# Patient Record
Sex: MALE | Race: WHITE
[De-identification: names, ages, dates, MRNs, and addresses within clinical notes are randomized per-mention and may not be internally consistent; named-entity substitution may affect disease eponyms.]

---

## 2019-09-23 ENCOUNTER — HOSPITAL ENCOUNTER (EMERGENCY)
Dept: HOSPITAL 41 - JD.ED | Age: 29
Discharge: HOME | End: 2019-09-23
Payer: COMMERCIAL

## 2019-09-23 DIAGNOSIS — M54.5: Primary | ICD-10-CM

## 2019-09-23 DIAGNOSIS — F17.210: ICD-10-CM

## 2019-09-23 PROCEDURE — 99283 EMERGENCY DEPT VISIT LOW MDM: CPT

## 2019-09-23 NOTE — EDM.PDOC
ED HPI GENERAL MEDICAL PROBLEM





- General


Chief Complaint: Back Pain or Injury


Stated Complaint: LOWER BACK PAIN


Time Seen by Provider: 09/23/19 16:27


Source of Information: Reports: Patient


History Limitations: Reports: No Limitations





- History of Present Illness


INITIAL COMMENTS - FREE TEXT/NARRATIVE: 


Patient is a 29-year-old male who presents to the ED complaining of low back 

pain with pain radiating down the posterior aspects of the legs just above the 

ankles bilaterally. Patient states today he was lifting a heavy piece of 

equipment called a bearing housing for pumpline pump. States after lifting 

developed this pain that has increased. Notes increased pain with bending over 

and ambulating.  Denies foot drop, saddle anesthesia, incontinence to urine or 

stool, abdominal pain, or urinary retention. He has no history of back issues 

in the past as such. Denies any back surgeries. He has taken ibuprofen 600 mg 

prior to arrival with no relief.





Treatments PTA: Reports: NSAIDS


  ** Lower Back


Pain Score (Numeric/FACES): 7





- Related Data


 Allergies











Allergy/AdvReac Type Severity Reaction Status Date / Time


 


No Known Allergies Allergy   Verified 09/23/19 16:12











Home Meds: 


 Home Meds





Acetaminophen/HYDROcodone [Norco 325-5 MG] 1 tab PO Q6H PRN #10 tablet 09/23/19 

[Rx]


Cyclobenzaprine [Flexeril] 10 mg PO TID PRN #21 tab 09/23/19 [Rx]


Ibuprofen 600 mg PO DAILY 09/23/19 [History]


Ibuprofen 800 mg PO TID PRN #30 tablet 09/23/19 [Rx]











Past Medical History


Genitourinary History: Reports: Renal Calculus


Neurological History: Reports: Migraines





- Past Surgical History


Musculoskeletal Surgical History: Reports: Shoulder Surgery





Social & Family History





- Tobacco Use


Smoking Status *Q: Current Every Day Smoker


Years of Tobacco use: 15


Packs/Tins Daily: 0.5





- Caffeine Use


Caffeine Use: Reports: Energy Drinks, Soda, Tea





- Recreational Drug Use


Recreational Drug Use: No





ED ROS GENERAL





- Review of Systems


Review Of Systems: ROS reveals no pertinent complaints other than HPI.





ED EXAM,LOWER BACK PAIN/INJURY





- Physical Exam


Exam: See Below


Exam Limited By: No Limitations


General Appearance: Alert, WD/WN, Moderate Distress


Eye Exam: Bilateral Eye: Normal Inspection


Ears: Hearing Grossly Normal


Nose: Normal Inspection


Throat/Mouth: Normal Voice, No Airway Compromise


Head: Atraumatic, Normocephalic


Neck: Normal Inspection, Supple


Respiratory/Chest: No Respiratory Distress, Lungs Clear, Normal Breath Sounds, 

No Accessory Muscle Use


Cardiovascular: Normal Peripheral Pulses, Regular Rate, Rhythm, No Murmur


GI/Abdominal: Normal Bowel Sounds, Soft, Non-Tender, No Organomegaly, No 

Distention


Back Exam: Normal Inspection, Decreased Range of Motion, Paraspinal Tenderness (

Beltline), Vertebral Tenderness (Just above beltline L4-L5)


Extremities: Normal Inspection, Non-Tender, No Pedal Edema


Neurological: Alert, Normal Mood/Affect, Normal Dorsiflexion, CN II-XII Intact, 

Normal Plantar Flexion, No Motor/Sensory Deficits, Oriented x 3, Difficulty 

Walking (Due to back pain.), Other (No weakness discrepancy is noted to the 

lower extremities.).  No: Normal Gait, Straight Leg Raise (L), Straight Leg 

Raise (R)


Psychiatric: Normal Affect, Normal Mood


Skin Exam: Warm, Dry





Course





- Vital Signs


Last Recorded V/S: 


 Last Vital Signs











Temp  98.1 F   09/23/19 16:07


 


Pulse  98   09/23/19 16:07


 


Resp  16   09/23/19 16:07


 


BP  112/83   09/23/19 16:07


 


Pulse Ox  99   09/23/19 16:07














- Orders/Labs/Meds


Meds: 


Medications














Discontinued Medications














Generic Name Dose Route Start Last Admin





  Trade Name Fabian  PRN Reason Stop Dose Admin


 


Hydrocodone Bitart/Acetaminophen  1 tab  09/23/19 16:39  09/23/19 16:51





  [place] 325-5 Mg  PO  09/23/19 16:40  1 tab





  ONETIME ONE   Administration





     





     





     





     


 


Cyclobenzaprine HCl  10 mg  09/23/19 16:39  09/23/19 16:51





  Flexeril  PO  09/23/19 16:40  10 mg





  ONETIME ONE   Administration





     





     





     





     














- Re-Assessments/Exams


Free Text/Narrative Re-Assessment/Exam: 


Patient has back discomfort along the L4-L5 region with radiation of pain down 

his legs just above the ankles. He states most of the pain is actually along 

the lateral aspect of his legs along the IT band and stops just above the 

knees. I'm concerned patient has strained his back and also may have caused 

disc herniation with impingement of  lumbar nerve. This will need to be 

confirmed by MRI if symptoms do not improve or worsen. Treatment today will 

consist of pain management including: Norco one tablet every 6 hours and also 

Flexeril one tab 3 times a day when necessary for  muscle spasms and pain.  He'

ll be off work over the next couple days. He will follow up with a occupational 

med doctor for further evaluation and modification of job duties this week. 

Return precautions were discussed with the patient. He had no further questions 

or concerns and agreed with plan.








Departure





- Departure


Time of Disposition: 16:58


Disposition: Home, Self-Care 01


Condition: Good


Clinical Impression: 


 Low back pain radiating down leg








- Discharge Information


Prescriptions: 


Acetaminophen/HYDROcodone [Norco 325-5 MG] 1 tab PO Q6H PRN #10 tablet


 PRN Reason: Pain (Severe 7-10)


Cyclobenzaprine [Flexeril] 10 mg PO TID PRN #21 tab


 PRN Reason: Muscle Spasm


Ibuprofen 800 mg PO TID PRN #30 tablet


 PRN Reason: Pain (Mild 1-3)


Instructions:  Radicular Pain, Acute Back Pain, Adult, Pain Medicine 

Instructions, Easy-to-Read


Referrals: 


PCP,None [Primary Care Provider] - 


Forms:  ED Department Discharge, ED Return to Work/School Form


Additional Instructions: 


Refrain from any activities that cause worsening discomfort. Utilize heat with 

gentle massage and ice thereafter 3 times a day, 30 minutes in duration, do not 

apply ice directly on the skin. For mild-to-moderate pain may utilize topical 

pain cream such as Biofreeze and also ibuprofen 800 mg 3 times a day. Take the 

ibuprofen with food. Do not take any additional NSAIDs. If stomach becomes 

irritated stopped taking ibuprofen and start taking Prilosec 20 mg every day 

for the next 2 weeks. For muscle spasms take Flexeril one tablet 3 times a day. 

For severe pain take Norco one tablet every 6 hours. Do not take Norco and 

Tylenol together. Do not drive while taking the Flexeril and/or Norco. Do not 

operate any heavy equipment as well. Follow-up with occupational med doc in the 

next 3-5 days for reevaluation and modification of job duties if required. 

Return back to the ED if you develop any new or worsening symptoms.

## 2021-02-12 ENCOUNTER — HOSPITAL ENCOUNTER (EMERGENCY)
Dept: HOSPITAL 41 - JD.ED | Age: 31
Discharge: HOME | End: 2021-02-12
Payer: COMMERCIAL
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
## 2021-02-12 DIAGNOSIS — S05.02XA: Primary | ICD-10-CM

## 2021-02-12 NOTE — EDM.PDOC
ED HPI GENERAL MEDICAL PROBLEM





- General


Chief Complaint: Eye Problems


Stated Complaint: FACIAL INJURY/L EYE


Time Seen by Provider: 02/12/21 14:01


Source of Information: Reports: Patient


History Limitations: Reports: No Limitations





- History of Present Illness


INITIAL COMMENTS - FREE TEXT/NARRATIVE: 





The patient presents with left eye pain.  He was filling up his tire today on h

is pickup and the tire exploded.  He has pain to the left eye.  He did get some 

pieces of rubber out.  He can still see.  His tetanus is up to date.


Onset: Sudden


Duration: Hour(s):


Location: Reports: Other (Left eye)


Quality: Reports: Sharp


Severity: Moderate


Improves with: Reports: None


Worsens with: Reports: None


Associated Symptoms: Reports: No Other Symptoms


  ** Left Eye


Pain Score (Numeric/FACES): 2





- Related Data


                                    Allergies











Allergy/AdvReac Type Severity Reaction Status Date / Time


 


No Known Allergies Allergy   Verified 02/12/21 14:04











Home Meds: 


                                    Home Meds





Ibuprofen 800 mg PO TID PRN #30 tablet 09/23/19 [Rx]


Ciprofloxacin [Ciloxan 0.3% Ophth Soln] 1 drop EYELF Q4H #1 bottle 02/12/21 [Rx]











Past Medical History


Genitourinary History: Reports: Renal Calculus


Neurological History: Reports: Migraines





- Past Surgical History


Musculoskeletal Surgical History: Reports: Shoulder Surgery





Social & Family History





- Caffeine Use


Caffeine Use: Reports: Energy Drinks, Soda, Tea





ED ROS GENERAL





- Review of Systems


Review Of Systems: See Below


Constitutional: Reports: No Symptoms


HEENT: Reports: Eye Pain


Respiratory: Reports: No Symptoms


Cardiovascular: Reports: No Symptoms


Endocrine: Reports: No Symptoms


GI/Abdominal: Reports: No Symptoms


: Reports: No Symptoms


Musculoskeletal: Reports: No Symptoms


Skin: Reports: No Symptoms


Neurological: Reports: No Symptoms





ED EXAM GENERAL W FULL EYE





- Physical Exam


Exam: See Below


Exam Limited By: No Limitations


General Appearance: Alert, No Apparent Distress


Eye Exam: Left Eye: Other (Mild edema of the upper and lower limb), Bilateral 

Eye: EOMI, PERRL


Visual Acuity (R) 20/: 25


Visual Acuity (L) 20/: 40


Eyelids: Left: Edema (mild)


Conjunctiva & Sclera: Left: Other (Small little rubber foreign bodies)


Cornea Exam: Left: Corneal Abrasion


Extraocular Movements: Bilateral: Intact


Pupillary Size: Bilateral: 4 mm


Pupillary Reaction: Bilateral: Brisk


Anterior Chamber: Left: Normal Appearance


Ears: Normal External Exam


Nose: Normal Inspection


Head: Atraumatic, Normocephalic





Course





- Vital Signs


Last Recorded V/S: 


                                Last Vital Signs











Temp  98.7 F   02/12/21 13:58


 


Pulse  82   02/12/21 13:58


 


Resp  18   02/12/21 13:58


 


BP  143/100 H  02/12/21 13:58


 


Pulse Ox  100   02/12/21 13:58














- Orders/Labs/Meds


Meds: 


Medications














Discontinued Medications














Generic Name Dose Route Start Last Admin





  Trade Name Fabian  PRN Reason Stop Dose Admin


 


Fluorescein Sodium  1 mg  02/12/21 14:15  02/12/21 14:22





  Ful-Keiry  EYEBOTH  02/12/21 14:16  1 mg





  ONETIME ONE   Administration


 


Proparacaine HCl  1 ml  02/12/21 14:16  02/12/21 14:22





  Proparacaine 0.5% Ophth Soln  EYEBOTH  02/12/21 14:17  1 ml





  ONETIME ONE   Administration














- Re-Assessments/Exams


Free Text/Narrative Re-Assessment/Exam: 





02/12/21 15:07


He has come very small flecks of rubber to the medial lower eye.  These are very

small and out of his visual field.  I feel they are not harmful and it would 

cause more trauma to go after them.  He does have a corneal abrasion that I will

treat with cipro.





Departure





- Departure


Time of Disposition: 15:15


Disposition: Home, Self-Care 01


Condition: Good


Clinical Impression: 


Corneal abrasion


Qualifiers:


 Encounter type: initial encounter Laterality: left Qualified Code(s): S05.02XA 

- Injury of conjunctiva and corneal abrasion without foreign body, left eye, 

initial encounter








- Discharge Information


*PRESCRIPTION DRUG MONITORING PROGRAM REVIEWED*: Not Applicable


*COPY OF PRESCRIPTION DRUG MONITORING REPORT IN PATIENT DANIELLE: Not Applicable


Prescriptions: 


Ciprofloxacin [Ciloxan 0.3% Ophth Soln] 1 drop EYELF Q4H #1 bottle


Referrals: 


PCP,None [Primary Care Provider] - 


Forms:  ED Department Discharge


Additional Instructions: 


Take tylenol or motrin for pain.  Use the cipro drops 1 drop every 4 hours while

awake for a week.  Please return if you are worse.





Sepsis Event Note (ED)





- Evaluation


Sepsis Screening Result: No Definite Risk





- Focused Exam


Vital Signs: 


                                   Vital Signs











  Temp Pulse Resp BP Pulse Ox


 


 02/12/21 13:58  98.7 F  82  18  143/100 H  100